# Patient Record
Sex: MALE | Race: WHITE | Employment: UNEMPLOYED | URBAN - METROPOLITAN AREA
[De-identification: names, ages, dates, MRNs, and addresses within clinical notes are randomized per-mention and may not be internally consistent; named-entity substitution may affect disease eponyms.]

---

## 2017-01-01 ENCOUNTER — TELEPHONE (OUTPATIENT)
Dept: INTERNAL MEDICINE CLINIC | Age: 82
End: 2017-01-01

## 2017-01-01 ENCOUNTER — CARE COORDINATION (OUTPATIENT)
Dept: CASE MANAGEMENT | Age: 82
End: 2017-01-01

## 2017-01-01 RX ORDER — HYDROCODONE BITARTRATE AND ACETAMINOPHEN 7.5; 325 MG/1; MG/1
1 TABLET ORAL EVERY 6 HOURS PRN
Qty: 30 TABLET | Status: CANCELLED | OUTPATIENT
Start: 2017-01-01 | End: 2017-01-01

## 2017-01-01 RX ORDER — HYDROCODONE BITARTRATE AND ACETAMINOPHEN 7.5; 325 MG/1; MG/1
1 TABLET ORAL EVERY 6 HOURS PRN
Qty: 240 TABLET | Refills: 0 | Status: SHIPPED | OUTPATIENT
Start: 2017-01-01 | End: 2017-01-01 | Stop reason: SDUPTHER

## 2017-01-01 RX ORDER — HYDROCODONE BITARTRATE AND ACETAMINOPHEN 7.5; 325 MG/1; MG/1
1 TABLET ORAL EVERY 6 HOURS PRN
Qty: 240 TABLET | Refills: 0 | Status: SHIPPED | OUTPATIENT
Start: 2017-01-01 | End: 2017-01-01

## 2017-01-01 RX ORDER — HYDROCODONE BITARTRATE AND ACETAMINOPHEN 5; 325 MG/1; MG/1
1 TABLET ORAL EVERY 6 HOURS PRN
OUTPATIENT
Start: 2017-01-01 | End: 2017-01-01

## 2017-01-01 RX ORDER — HYDROCODONE BITARTRATE AND ACETAMINOPHEN 5; 325 MG/1; MG/1
1 TABLET ORAL EVERY 6 HOURS PRN
Qty: 240 TABLET | Refills: 0 | Status: SHIPPED | OUTPATIENT
Start: 2017-01-01 | End: 2017-01-01

## 2017-01-01 RX ORDER — HYDROCODONE BITARTRATE AND ACETAMINOPHEN 7.5; 325 MG/1; MG/1
1 TABLET ORAL EVERY 6 HOURS PRN
Qty: 30 TABLET | Refills: 0 | Status: SHIPPED | OUTPATIENT
Start: 2017-01-01 | End: 2017-01-01 | Stop reason: SDUPTHER

## 2017-01-01 RX ORDER — HYDROCODONE BITARTRATE AND ACETAMINOPHEN 7.5; 325 MG/1; MG/1
1 TABLET ORAL EVERY 6 HOURS PRN
Qty: 240 TABLET | Refills: 0 | Status: SHIPPED | OUTPATIENT
Start: 2017-01-01

## 2017-01-03 ENCOUNTER — CARE COORDINATION (OUTPATIENT)
Dept: CARE COORDINATION | Age: 82
End: 2017-01-03

## 2017-02-24 PROBLEM — L03.119 CELLULITIS OF LOWER EXTREMITY: Status: ACTIVE | Noted: 2017-01-01

## 2017-02-24 PROBLEM — R26.9 GAIT DIFFICULTY: Status: ACTIVE | Noted: 2017-01-01

## 2017-02-24 PROBLEM — N18.30 CHRONIC KIDNEY DISEASE, STAGE III (MODERATE) (HCC): Status: ACTIVE | Noted: 2017-01-01

## 2017-02-24 PROBLEM — R23.8 BLISTERS OF MULTIPLE SITES: Status: ACTIVE | Noted: 2017-01-01

## 2017-09-25 PROBLEM — S01.01XA LACERATION OF SCALP: Status: ACTIVE | Noted: 2017-01-01
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
## 2017-09-25 PROBLEM — N18.30 CKD (CHRONIC KIDNEY DISEASE) STAGE 3, GFR 30-59 ML/MIN (HCC): Status: ACTIVE | Noted: 2017-01-01

## 2017-09-25 PROBLEM — I49.5 SICK SINUS SYNDROME (HCC): Status: ACTIVE | Noted: 2017-01-01

## 2017-09-25 PROBLEM — R23.8 BLISTERS OF MULTIPLE SITES: Status: RESOLVED | Noted: 2017-01-01 | Resolved: 2017-01-01

## 2017-09-25 PROBLEM — L03.119 CELLULITIS OF LOWER EXTREMITY: Status: RESOLVED | Noted: 2017-01-01 | Resolved: 2017-01-01

## 2017-09-25 PROBLEM — J96.01 ACUTE RESPIRATORY FAILURE WITH HYPOXIA (HCC): Status: ACTIVE | Noted: 2017-01-01

## 2017-09-25 PROBLEM — I50.31 ACUTE DIASTOLIC HEART FAILURE (HCC): Status: ACTIVE | Noted: 2017-01-01

## 2017-09-30 PROBLEM — I50.33 ACUTE ON CHRONIC DIASTOLIC HF (HEART FAILURE) (HCC): Status: ACTIVE | Noted: 2017-01-01

## 2017-10-16 NOTE — CARE COORDINATION
Called skilled nursing facility [facility] for a patient update.  reports patient is LTC and skilled. Post acute transition coordinator signing off.  Sreekanth Carey, BSN  [facility] Transition Coordinator  [phone]

## 2017-10-18 NOTE — TELEPHONE ENCOUNTER
Per Hellen Marie, pt currently has a fluid restriction of 1800ML daily, no sweets/added salt diet. Hellen Marie states pt is very non-compliant with this and his daughter brings in \"home made chicken broth\" for pt.  Please review and advise

## 2018-01-01 ENCOUNTER — TELEPHONE (OUTPATIENT)
Dept: INTERNAL MEDICINE CLINIC | Age: 83
End: 2018-01-01

## 2018-01-01 DIAGNOSIS — I50.32 CHRONIC DIASTOLIC HEART FAILURE (HCC): Primary | ICD-10-CM

## 2018-01-01 RX ORDER — MORPHINE SULFATE 100 MG/5ML
SOLUTION ORAL
Qty: 30 ML | Refills: 0 | Status: SHIPPED | OUTPATIENT
Start: 2018-01-01 | End: 2018-02-10

## 2018-01-01 RX ORDER — LORAZEPAM 2 MG/ML
CONCENTRATE ORAL
Qty: 30 ML | Refills: 0 | Status: SHIPPED | OUTPATIENT
Start: 2018-01-01 | End: 2018-02-10

## 2018-01-02 NOTE — TELEPHONE ENCOUNTER
Spoke with [facility] regarding coumadin instructions and she states pt is not on any new medications

## 2018-01-08 NOTE — TELEPHONE ENCOUNTER
No coumadin today tomorrow and day after and pt/inr on 1/11/18  How long is he going to be on cipro and what dose is he on now?

## 2018-01-08 NOTE — TELEPHONE ENCOUNTER
[id number] [facility] for pt/ot   I sent fax to them on Friday to hold coumadin fri,sat and sun and do pt/inr on Monday-today  I guess as usual they did not get this and they should do pt/inr today -as stat ,if not done already-and call me with report today

## 2018-01-08 NOTE — TELEPHONE ENCOUNTER
On Friday she sent a fax with INR result--4.4. Coumadin has been held. Needs orders please. Pt had a fall on Saturday, no apparent injuries. She needs an order for PT eval and treat for transfers and safe use of recliner. Call Zul[facility] Galvan today please.

## 2018-01-12 NOTE — TELEPHONE ENCOUNTER
She sent CXR result on 1-9-18 showing a left pleural effusion and mild vascular congestion. Appetite has been minimal and he is lethargic. Daughter is very concerned and she would like the doctor to prescribe an abx. PLease call Bruna Armando @ [facility]. They also faxed his INR yesterday which was 3.2. Coumadin is on hold. They need orders.

## 2018-01-12 NOTE — TELEPHONE ENCOUNTER
No coumadin today tomorrow and day after and pt/inr on 1/15/18  He was on antibiotics and cxr does not appear like he has pneumonia and appears more like heart failure  See if we have hie med list and if not see if they can send us a med list please.

## 2018-01-26 NOTE — TELEPHONE ENCOUNTER
Script called in to [facility].  Will fax script to them once fax machine is back working

## 2018-01-26 NOTE — TELEPHONE ENCOUNTER
faxed over order requests. Actively dying. Signed up with hospice yesterday.   FAx scripts for roxinol and ativan ASA to [phone] [facility]

## 2018-01-26 NOTE — TELEPHONE ENCOUNTER
Orders written for medication and call the nurse at [address] [facility] and find out where to send the [facility]

## 2018-01-29 ENCOUNTER — TELEPHONE (OUTPATIENT)
Dept: INTERNAL MEDICINE CLINIC | Age: 83
End: 2018-01-29

## 2019-07-23 NOTE — TELEPHONE ENCOUNTER
Panel Management Review      Patient has the following on his problem list:     Diabetes    ASA: Passed    Last A1C  Lab Results   Component Value Date    A1C 6.5 05/02/2019    A1C 6.8 01/31/2019    A1C 6.8 07/12/2018    A1C 7.4 03/26/2018    A1C 7.9 10/26/2017     A1C tested: Passed    Last LDL:    Lab Results   Component Value Date    CHOL 233 03/26/2018     Lab Results   Component Value Date    HDL 35 03/26/2018     Lab Results   Component Value Date     03/26/2018     Lab Results   Component Value Date    TRIG 262 03/26/2018     Lab Results   Component Value Date    CHOLHDLRATIO 7.7 09/03/2015     Lab Results   Component Value Date    NHDL 198 03/26/2018       Is the patient on a Statin? YES             Is the patient on Aspirin? YES    Medications     HMG CoA Reductase Inhibitors     STATIN NOT PRESCRIBED, INTENTIONAL,       Salicylates     aspirin 81 MG tablet             Last three blood pressure readings:  BP Readings from Last 3 Encounters:   05/02/19 122/70   03/21/19 122/78   01/31/19 123/72       Date of last diabetes office visit: 5/2/2019     Tobacco History:     History   Smoking Status     Current Every Day Smoker     Types: Cigarettes   Smokeless Tobacco     Never Used           Composite cancer screening  Chart review shows that this patient is due/due soon for the following Colonoscopy  Summary:    Patient is due/failing the following:   Patient seen 5/2/2019 per last office note to follow up 8/2/2019    Action needed:   None      Type of outreach:    none    Questions for provider review:    None                                                                                                                                    Cait Mejias cma       Chart routed to closed .           ok